# Patient Record
(demographics unavailable — no encounter records)

---

## 2024-11-20 NOTE — ASSESSMENT
[FreeTextEntry1] : 54  year old female with primary hypothyroidism, a small left thyroid nodule, no longer visualized, obesity, prediabetes and hyperlipidemia.    She is mildly thyrotoxic on current dose of LT4.       1. Hypothyroidism-   Reduce LT4 to 100 mcg daily. 2.  preDM-   continue lifestyle modification  3.  hyperlipidemia-   Continue Atorvastatin.   4.  Low bone mass-  Stable on DXA in 2023.  No indication for bisphosphonate therapy due to low FRAX score.   Continue vitamin D weekly.  Repeat in 2025  Follow up in 4- 6 months.

## 2024-11-20 NOTE — REVIEW OF SYSTEMS
[Fatigue] : no fatigue [Recent Weight Gain (___ Lbs)] : no recent weight gain [Recent Weight Loss (___ Lbs)] : no recent weight loss [Heartburn] : no heartburn [Dry Skin] : no dry skin [Hair Loss] : no hair loss

## 2024-11-20 NOTE — PHYSICAL EXAM
[Obese] : obese [No Acute Distress] : no acute distress [Normal Sclera/Conjunctiva] : normal sclera/conjunctiva [No Proptosis] : no proptosis [No Neck Mass] : no neck mass was observed [No LAD] : no lymphadenopathy [Supple] : the neck was supple [Thyroid Not Enlarged] : the thyroid was not enlarged [No Thyroid Nodules] : no palpable thyroid nodules [No Respiratory Distress] : no respiratory distress [Clear to Auscultation] : lungs were clear to auscultation bilaterally [Normal S1, S2] : normal S1 and S2 [No Murmurs] : no murmurs [Normal Rate] : heart rate was normal [Regular Rhythm] : with a regular rhythm [Acanthosis Nigricans] : acanthosis nigricans present [Normal Affect] : the affect was normal [Normal Insight/Judgement] : insight and judgment were intact [Normal Mood] : the mood was normal

## 2024-11-20 NOTE — DATA REVIEWED
[FreeTextEntry1] : Thyroid US 1/29/2021: No discrete nodules  Thyroid US 3//19/2019 Heterogeneous atrophic gland with 4mm left lobe nodule.    DXA 9/6/2023: Spine T score -0.5 Femoral neck -1.9, total hip -0.5  (stable)  DXA: 7/29/2021: L spine T score -0.7 Left femoral Neck T score -1.8

## 2024-11-20 NOTE — HISTORY OF PRESENT ILLNESS
[FreeTextEntry1] : Follow up hypothyroidism, prediabetes, hyperlipidemia, low bone mass  History of moderate Primary hypothyroidism due to Hashimoto's Thyroiditis diagnosed at age 35, and improved with LT4 therapy. US in the past showed a 4 mm nodule, which was no longer visualized on imaging in 2021.  Current Regimen: LT4 125 mcg 6 tabs per week    Denies fatigue.

## 2025-02-20 NOTE — PHYSICAL EXAM
[Normal S1, S2] : normal S1, S2 [Murmur] : murmur [Normal] : moves all extremities, no focal deficits, normal speech [de-identified] :  No carotid bruits auscultated bilaterally.. [de-identified] : 2/6 systolic murmur Erb's point [de-identified] : mild bilateral pitting edema of lower extremities.

## 2025-02-20 NOTE — CARDIOLOGY SUMMARY
[de-identified] : 2/20/2025, SB, normal ECG 2/23/2024, NSR, normal ECG 8/4/2023, NSR, normal ECG [de-identified] : 10/20/2023, Plain Treadmill Stress Test: Exercised for 6 minutes utilizing Standard Lon Protocol. No chest pain or abnormal dyspnea. No ECG changes to suggest ischemia. [de-identified] : 10/20/2023, LV EF 65%, mild MR, mild TR

## 2025-02-20 NOTE — HISTORY OF PRESENT ILLNESS
[FreeTextEntry1] : Historical Perspective: 53 year old female with PMHx of HLD, hypothyroidism presents for a cardiac evaluation for 1 month of chest pain. Mostly occurs at night. She started acid suppression therapy, which has helped a little, however, she still is getting the chest pain from time to time.   There is no history of MI, CVA, CHF, or previous coronary intervention.  Mother has history of atrial fibrillation?  Current Health Status: Patient having mild lower extremity edema. No associated dyspnea or orthopnea.

## 2025-02-20 NOTE — DISCUSSION/SUMMARY
[FreeTextEntry1] : 1. Chest Pain: likely GI related. Improved with not eating at night and taking acid suppression therapy PRN.  2. HLD: continue atorvastatin 20mg nightly.  3. Lower Extremity Edema: will update echocardiogram.  Office will call with results.  Follow up in 12 months.  [EKG obtained to assist in diagnosis and management of assessed problem(s)] : EKG obtained to assist in diagnosis and management of assessed problem(s)

## 2025-03-27 NOTE — HISTORY OF PRESENT ILLNESS
[FreeTextEntry1] : Follow up hypothyroidism, prediabetes, hyperlipidemia, low bone mass  History of moderate Primary hypothyroidism due to Hashimoto's Thyroiditis diagnosed at age 35, and improved with LT4 therapy. US in the past showed a 4 mm nodule, which was no longer visualized on imaging in 2021.  Current Regimen: LT4 100 mcg daily (reduced last visit)   Denies any associated fatigue, skin or hair changes.

## 2025-03-27 NOTE — ASSESSMENT
[FreeTextEntry1] : 54  year old female with primary hypothyroidism, a small left thyroid nodule, no longer visualized, obesity, prediabetes and hyperlipidemia.    She is now euthyroid on LT4 therapy.       1. Hypothyroidism-   Continue LT4 100 mcg daily.   2.  preDM-   continue lifestyle modification  3.  hyperlipidemia-   Continue Atorvastatin.   4.  Low bone mass-  Stable on DXA in 2023.  No indication for bisphosphonate therapy due to low FRAX score.   Continue vitamin D weekly.   Check DXA later this year.     Follow up in 6 months.